# Patient Record
Sex: FEMALE | Race: WHITE | URBAN - METROPOLITAN AREA
[De-identification: names, ages, dates, MRNs, and addresses within clinical notes are randomized per-mention and may not be internally consistent; named-entity substitution may affect disease eponyms.]

---
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
## 2023-06-19 ENCOUNTER — APPOINTMENT (RX ONLY)
Dept: URBAN - METROPOLITAN AREA CLINIC 73 | Facility: CLINIC | Age: 23
Setting detail: DERMATOLOGY
End: 2023-06-19

## 2023-06-19 DIAGNOSIS — L73.9 FOLLICULAR DISORDER, UNSPECIFIED: ICD-10-CM | Status: INADEQUATELY CONTROLLED

## 2023-06-19 DIAGNOSIS — L663 OTHER SPECIFIED DISEASES OF HAIR AND HAIR FOLLICLES: ICD-10-CM | Status: INADEQUATELY CONTROLLED

## 2023-06-19 DIAGNOSIS — Z71.89 OTHER SPECIFIED COUNSELING: ICD-10-CM

## 2023-06-19 DIAGNOSIS — L738 OTHER SPECIFIED DISEASES OF HAIR AND HAIR FOLLICLES: ICD-10-CM | Status: INADEQUATELY CONTROLLED

## 2023-06-19 PROBLEM — L02.426 FURUNCLE OF LEFT LOWER LIMB: Status: ACTIVE | Noted: 2023-06-19

## 2023-06-19 PROBLEM — L02.425 FURUNCLE OF RIGHT LOWER LIMB: Status: ACTIVE | Noted: 2023-06-19

## 2023-06-19 PROCEDURE — ? COUNSELING

## 2023-06-19 PROCEDURE — ? PRESCRIPTION

## 2023-06-19 PROCEDURE — 99204 OFFICE O/P NEW MOD 45 MIN: CPT

## 2023-06-19 PROCEDURE — ? TREATMENT REGIMEN

## 2023-06-19 PROCEDURE — ? INVENTORY

## 2023-06-19 RX ORDER — DOXYCYCLINE HYCLATE 100 MG/1
CAPSULE, GELATIN COATED ORAL BID
Qty: 28 | Refills: 0 | Status: ERX | COMMUNITY
Start: 2023-06-19

## 2023-06-19 RX ADMIN — DOXYCYCLINE HYCLATE: 100 CAPSULE, GELATIN COATED ORAL at 00:00

## 2023-06-19 ASSESSMENT — LOCATION DETAILED DESCRIPTION DERM
LOCATION DETAILED: RIGHT ANTERIOR PROXIMAL THIGH
LOCATION DETAILED: LEFT ANTERIOR PROXIMAL THIGH

## 2023-06-19 ASSESSMENT — LOCATION ZONE DERM: LOCATION ZONE: LEG

## 2023-06-19 ASSESSMENT — LOCATION SIMPLE DESCRIPTION DERM
LOCATION SIMPLE: RIGHT THIGH
LOCATION SIMPLE: LEFT THIGH

## 2023-06-19 NOTE — PROCEDURE: TREATMENT REGIMEN
Detail Level: Zone
Initiate Treatment: Doxycycline 100mg bid x 2 weeks
Otc Regimen: Panoxyl wash daily

## 2023-06-19 NOTE — HPI: RASH
What Type Of Note Output Would You Prefer (Optional)?: Standard Output
Is The Patient Presenting As Previously Scheduled?: Yes
How Severe Is Your Rash?: mild
Is This A New Presentation, Or A Follow-Up?: Rash
Additional History: Has used body glide thinking area was chaffing.

## 2024-01-23 ENCOUNTER — APPOINTMENT (RX ONLY)
Dept: URBAN - METROPOLITAN AREA CLINIC 73 | Facility: CLINIC | Age: 24
Setting detail: DERMATOLOGY
End: 2024-01-23

## 2024-01-23 DIAGNOSIS — K13.0 DISEASES OF LIPS: ICD-10-CM

## 2024-01-23 DIAGNOSIS — Z71.89 OTHER SPECIFIED COUNSELING: ICD-10-CM

## 2024-01-23 DIAGNOSIS — L21.8 OTHER SEBORRHEIC DERMATITIS: ICD-10-CM

## 2024-01-23 PROCEDURE — ? COUNSELING

## 2024-01-23 PROCEDURE — 99213 OFFICE O/P EST LOW 20 MIN: CPT

## 2024-01-23 PROCEDURE — ? PRESCRIPTION

## 2024-01-23 PROCEDURE — ? TREATMENT REGIMEN

## 2024-01-23 RX ORDER — KETOCONAZOLE 20 MG/G
CREAM TOPICAL BID
Qty: 30 | Refills: 2 | Status: ERX | COMMUNITY
Start: 2024-01-23

## 2024-01-23 RX ORDER — DOXYCYCLINE HYCLATE 100 MG/1
TABLET, COATED ORAL
Qty: 28 | Refills: 0 | Status: ERX | COMMUNITY
Start: 2024-01-23

## 2024-01-23 RX ORDER — HYDROCORTISONE 25 MG/G
CREAM TOPICAL BID
Qty: 30 | Refills: 2 | Status: ERX | COMMUNITY
Start: 2024-01-23

## 2024-01-23 RX ADMIN — KETOCONAZOLE: 20 CREAM TOPICAL at 00:00

## 2024-01-23 RX ADMIN — DOXYCYCLINE HYCLATE: 100 TABLET, COATED ORAL at 00:00

## 2024-01-23 RX ADMIN — HYDROCORTISONE: 25 CREAM TOPICAL at 00:00

## 2024-01-23 NOTE — PROCEDURE: TREATMENT REGIMEN
Detail Level: Zone
Initiate Treatment: Ketoconazole cream twice a day for 2 weeks (mix with hydrocortisone cream)\\nHydrocortisone cream twice a day for 2 weeks (mix with ketoconazole cream)\\nDoxycycline 100 mg tablet twice a day for 2 weeks

## 2024-01-23 NOTE — HPI: RASH
What Type Of Note Output Would You Prefer (Optional)?: Standard Output
Is The Patient Presenting As Previously Scheduled?: Yes
How Severe Is Your Rash?: mild
Is This A New Presentation, Or A Follow-Up?: Rash
Additional History: Using OTC eczema cream.

## 2024-02-12 ENCOUNTER — APPOINTMENT (RX ONLY)
Dept: URBAN - METROPOLITAN AREA CLINIC 73 | Facility: CLINIC | Age: 24
Setting detail: DERMATOLOGY
End: 2024-02-12

## 2024-02-12 DIAGNOSIS — L21.8 OTHER SEBORRHEIC DERMATITIS: ICD-10-CM

## 2024-02-12 DIAGNOSIS — K13.0 DISEASES OF LIPS: ICD-10-CM

## 2024-02-12 DIAGNOSIS — Z71.89 OTHER SPECIFIED COUNSELING: ICD-10-CM

## 2024-02-12 PROCEDURE — ? TREATMENT REGIMEN

## 2024-02-12 PROCEDURE — 99213 OFFICE O/P EST LOW 20 MIN: CPT

## 2024-02-12 PROCEDURE — ? COUNSELING

## 2024-02-12 NOTE — PROCEDURE: TREATMENT REGIMEN
Detail Level: Zone
Discontinue Regimen: Doxycycline 100 mg tablet twice a day for 2 weeks
Continue Regimen: Ketoconazole cream twice a day for 7-10 days (mix with hydrocortisone cream)\\nHydrocortisone cream twice a day for 7-10 days (mix with ketoconazole cream)